# Patient Record
Sex: FEMALE | Race: WHITE | Employment: FULL TIME | URBAN - METROPOLITAN AREA
[De-identification: names, ages, dates, MRNs, and addresses within clinical notes are randomized per-mention and may not be internally consistent; named-entity substitution may affect disease eponyms.]

---

## 2024-03-28 ENCOUNTER — HOSPITAL ENCOUNTER (OUTPATIENT)
Dept: CT IMAGING | Age: 29
Discharge: HOME | End: 2024-03-30
Attending: INTERNAL MEDICINE
Payer: COMMERCIAL

## 2024-03-28 ENCOUNTER — TELEPHONE (OUTPATIENT)
Dept: PULMONOLOGY | Age: 29
End: 2024-03-28

## 2024-03-28 DIAGNOSIS — R91.8 OPACITY OF LUNG ON IMAGING STUDY: ICD-10-CM

## 2024-03-28 DIAGNOSIS — D86.9 SARCOIDOSIS: ICD-10-CM

## 2024-03-28 DIAGNOSIS — R59.0 MEDIASTINAL ADENOPATHY: Primary | ICD-10-CM

## 2024-03-28 PROCEDURE — 6360000004 HC RX CONTRAST MEDICATION: Performed by: RADIOLOGY

## 2024-03-28 PROCEDURE — 2580000003 HC RX 258: Performed by: RADIOLOGY

## 2024-03-28 PROCEDURE — 71260 CT THORAX DX C+: CPT

## 2024-03-28 RX ORDER — PREDNISONE 20 MG/1
20 TABLET ORAL DAILY
Qty: 30 TABLET | Refills: 2 | Status: SHIPPED | OUTPATIENT
Start: 2024-03-28 | End: 2024-04-27

## 2024-03-28 RX ORDER — SODIUM CHLORIDE 0.9 % (FLUSH) 0.9 %
10 SYRINGE (ML) INJECTION PRN
Status: DISCONTINUED | OUTPATIENT
Start: 2024-03-28 | End: 2024-03-31 | Stop reason: HOSPADM

## 2024-03-28 RX ADMIN — SODIUM CHLORIDE, PRESERVATIVE FREE 10 ML: 5 INJECTION INTRAVENOUS at 08:51

## 2024-03-28 RX ADMIN — IOPAMIDOL 75 ML: 755 INJECTION, SOLUTION INTRAVENOUS at 08:52

## 2024-03-28 NOTE — TELEPHONE ENCOUNTER
Attempted to call patient and left voicemail.  Patient had CT chest for follow-up of her mediastinal adenopathy.  This is consistent with sarcoidosis and is unchanged.  However given the significant amount of adenopathy and opacities in the right upper lobe that have not changed I do recommend that we go back on prednisone.  I discussed with her PCP earlier since she was asymptomatic that we could try to monitor off steroids.  However, given these findings I think we should go on the original plan of steroid taper and follow-up.    She should go on prednisone 20 mg daily for up to 3 months and then have a repeat CT.    Thanks,  RT